# Patient Record
Sex: MALE | Race: WHITE
[De-identification: names, ages, dates, MRNs, and addresses within clinical notes are randomized per-mention and may not be internally consistent; named-entity substitution may affect disease eponyms.]

---
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
## 2021-08-20 ENCOUNTER — HOSPITAL ENCOUNTER (OUTPATIENT)
Dept: HOSPITAL 53 - M LABSMTC | Age: 21
End: 2021-08-20
Attending: PEDIATRICS
Payer: COMMERCIAL

## 2021-08-20 DIAGNOSIS — Z20.822: Primary | ICD-10-CM
